# Patient Record
Sex: FEMALE | Race: OTHER | HISPANIC OR LATINO | URBAN - METROPOLITAN AREA
[De-identification: names, ages, dates, MRNs, and addresses within clinical notes are randomized per-mention and may not be internally consistent; named-entity substitution may affect disease eponyms.]

---
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
## 2024-01-20 NOTE — ED PROVIDER NOTE - PROVIDER TOKENS
This was a shared visit with the LOREN. I reviewed and verified the documentation. PROVIDER:[TOKEN:[[id number]],FOLLOWUP:[4-6 Days]]